# Patient Record
Sex: MALE | Race: WHITE | NOT HISPANIC OR LATINO | Employment: OTHER | URBAN - METROPOLITAN AREA
[De-identification: names, ages, dates, MRNs, and addresses within clinical notes are randomized per-mention and may not be internally consistent; named-entity substitution may affect disease eponyms.]

---

## 2022-01-28 ENCOUNTER — THERAPY VISIT (OUTPATIENT)
Dept: PHYSICAL THERAPY | Facility: CLINIC | Age: 77
End: 2022-01-28
Payer: COMMERCIAL

## 2022-01-28 DIAGNOSIS — M25.562 PAIN IN BOTH KNEES: ICD-10-CM

## 2022-01-28 DIAGNOSIS — M25.561 PAIN IN BOTH KNEES: ICD-10-CM

## 2022-01-28 PROCEDURE — 97110 THERAPEUTIC EXERCISES: CPT | Mod: GP | Performed by: PHYSICAL THERAPIST

## 2022-01-28 PROCEDURE — 97161 PT EVAL LOW COMPLEX 20 MIN: CPT | Mod: GP | Performed by: PHYSICAL THERAPIST

## 2022-01-28 ASSESSMENT — ACTIVITIES OF DAILY LIVING (ADL)
KNEEL ON THE FRONT OF YOUR KNEE: ACTIVITY IS MINIMALLY DIFFICULT
KNEE_ACTIVITY_OF_DAILY_LIVING_SCORE: 88.57
STAND: ACTIVITY IS NOT DIFFICULT
KNEE_ACTIVITY_OF_DAILY_LIVING_SUM: 62
WEAKNESS: I HAVE THE SYMPTOM BUT IT DOES NOT AFFECT MY ACTIVITY
STIFFNESS: I DO NOT HAVE THE SYMPTOM
WALK: ACTIVITY IS MINIMALLY DIFFICULT
RAW_SCORE: 62
PAIN: I HAVE THE SYMPTOM BUT IT DOES NOT AFFECT MY ACTIVITY
RISE FROM A CHAIR: ACTIVITY IS MINIMALLY DIFFICULT
GO DOWN STAIRS: ACTIVITY IS MINIMALLY DIFFICULT
AS_A_RESULT_OF_YOUR_KNEE_INJURY,_HOW_WOULD_YOU_RATE_YOUR_CURRENT_LEVEL_OF_DAILY_ACTIVITY?: NEARLY NORMAL
GIVING WAY, BUCKLING OR SHIFTING OF KNEE: I DO NOT HAVE THE SYMPTOM
SWELLING: I DO NOT HAVE THE SYMPTOM
GO UP STAIRS: ACTIVITY IS SOMEWHAT DIFFICULT
LIMPING: I DO NOT HAVE THE SYMPTOM
HOW_WOULD_YOU_RATE_THE_OVERALL_FUNCTION_OF_YOUR_KNEE_DURING_YOUR_USUAL_DAILY_ACTIVITIES?: NEARLY NORMAL
SQUAT: ACTIVITY IS NOT DIFFICULT
HOW_WOULD_YOU_RATE_THE_CURRENT_FUNCTION_OF_YOUR_KNEE_DURING_YOUR_USUAL_DAILY_ACTIVITIES_ON_A_SCALE_FROM_0_TO_100_WITH_100_BEING_YOUR_LEVEL_OF_KNEE_FUNCTION_PRIOR_TO_YOUR_INJURY_AND_0_BEING_THE_INABILITY_TO_PERFORM_ANY_OF_YOUR_USUAL_DAILY_ACTIVITIES?: 85
SIT WITH YOUR KNEE BENT: ACTIVITY IS NOT DIFFICULT

## 2022-01-28 NOTE — PROGRESS NOTES
[facility]    OUTPATIENT Physical Therapy ORTHOPEDIC EVALUATION  PLAN OF TREATMENT FOR OUTPATIENT REHABILITATION  (COMPLETE FOR INITIAL CLAIMS ONLY)  Patient's Last Name, First Name, M.I.  YOB: 1945  Rozinov,Gennady       Provider s Name:  [facility]   Medical Record No.  [id number]   Start of Care Date:  01/28/22   Onset Date:   01/24/22   Type:     _X__PT   ___OT Medical Diagnosis:    Encounter Diagnosis   Name Primary?     Pain in both knees         Treatment Diagnosis:  B knee pain         Goals:     01/28/22 0500   Body Part   Goals listed below are for B knee pain   Goal #1   Goal #1 stairs   Previous Functional Level No restrictions   Current Functional Level Ascend/descend stairs;one step at a time   Performance Level pain 5/10 with ascent   STG Target Performance Ascend/descend stairs;one step at a time   Performance Level painfree   Rationale to enter/leave the house safely   Due Date 02/25/22   LTG Target Performance Ascend/descend stairs;in a normal reciprocal pattern   Performance Level painfree   Rationale to enter/leave the house safely;for safe community access to buildings   Due Date 03/25/22       Therapy Frequency:  1x week  Predicted Duration of Therapy Intervention:  4 weeks    Angel Casillas, PT                 I CERTIFY THE NEED FOR THESE SERVICES FURNISHED UNDER        THIS PLAN OF TREATMENT AND WHILE UNDER MY CARE .             Physician Signature               Date    X_____________________________________________________                             Certification Date From:  01/28/22   Certification Date To:  04/12/22    Referring Provider:  Vinicius Bauer    Initial Assessment        See Epic Evaluation SOC Date: 01/28/22

## 2022-01-28 NOTE — PROGRESS NOTES
Physical Therapy Initial Evaluation  Subjective:    Therapist Generated HPI Evaluation  Problem details: Pt presents to PT with a chief complaint of recurrent B anterior knee pain with recent worsening in the past year (MD visit 1/24/22). Pain reported to be worse with stair climbing primarily and occasionally with ambulation. Pt diagnosed with patellar tendinitis and referred to PT .         Type of problem:  Bilateral knees.    This is a recurrent condition.  Condition occurred with:  Insidious onset.  Where condition occurred: for unknown reasons.  Patient reports pain:  Anterior.  Pain is described as sharp and is intermittent.  Pain is the same all the time.  Since onset symptoms are gradually improving.  Associated symptoms:  Loss of motion/stiffness. Symptoms are exacerbated by bending/squatting and ascending stairs  and relieved by ice.      Barriers include:  None as reported by patient.    Patient Health History         Pain is reported as 3/10 on pain scale.  General health as reported by patient is fair.  Pertinent medical history includes: high blood pressure and heart problems.   Red flags:  None as reported by patient.  Medical allergies: none.   Surgeries: hernia.    Current medications:  Cardiac medication and high blood pressure medication.    Occupation: retired.                                       Objective:    Gait:  Mild increased pronation bilaterally    Gait Type:  Normal                                                      Hip Evaluation    Hip Strength:      Extension:  Left: 4+/5  Pain:Right: 4+/5    Pain:    Abduction:  Left: 4+/5     Pain:Right: 4+/5    Pain:                           Knee Evaluation:  ROM:    AROM      Extension:  Left: 0    Right:  0  Flexion: Left: 130    Right: 130        Strength:     Extension:  Left: 5/5   Pain:      Right: 5/5    Pain:+  Flexion:  Left: 5/5   Pain:      Right: 5/5   Pain:    Quad Set Left: Good    Pain:   Quad Set Right: Good     Pain:    Special Tests:     Left knee negative for the following special tests:  Meninscal    Right knee negative for the following special tests:  Meniscal  Palpation:      Right knee tenderness present at:  Patellar Tendon      Functional Testing:  : Min anterior knee pain with step ups, inconsistent per patient.                  General     ROS    Assessment/Plan:    Patient is a 76 year old male with both sides knee complaints.    Patient has the following significant findings with corresponding treatment plan.                Diagnosis 1:  B knee pain  Pain -  manual therapy, splint/taping/bracing/orthotics and self management  Decreased joint mobility - manual therapy and therapeutic exercise  Decreased strength - therapeutic exercise and therapeutic activities  Impaired muscle performance - neuro re-education    Therapy Evaluation Codes:     Cumulative Therapy Evaluation is: Low complexity.    Previous and current functional limitations:  (See Goal Flow Sheet for this information)    Short term and Long term goals: (See Goal Flow Sheet for this information)     Communication ability:  Patient appears to be able to clearly communicate and understand verbal and written communication and follow directions correctly.  Treatment Explanation - The following has been discussed with the patient:   RX ordered/plan of care  Anticipated outcomes  Possible risks and side effects  This patient would benefit from PT intervention to resume normal activities.   Rehab potential is good.    Frequency:  1 X week, once daily  Duration:  for 4 weeks  Discharge Plan:  Achieve all LTG.  Independent in home treatment program.  Reach maximal therapeutic benefit.    Please refer to the daily flowsheet for treatment today, total treatment time and time spent performing 1:1 timed codes.

## 2022-02-11 ENCOUNTER — THERAPY VISIT (OUTPATIENT)
Dept: PHYSICAL THERAPY | Facility: CLINIC | Age: 77
End: 2022-02-11
Payer: COMMERCIAL

## 2022-02-11 DIAGNOSIS — M25.561 PAIN IN BOTH KNEES: Primary | ICD-10-CM

## 2022-02-11 DIAGNOSIS — M25.562 PAIN IN BOTH KNEES: Primary | ICD-10-CM

## 2022-02-11 PROCEDURE — 97110 THERAPEUTIC EXERCISES: CPT | Mod: GP | Performed by: PHYSICAL THERAPIST

## 2022-02-11 PROCEDURE — 97112 NEUROMUSCULAR REEDUCATION: CPT | Mod: GP | Performed by: PHYSICAL THERAPIST

## 2022-02-21 ENCOUNTER — TRANSCRIBE ORDERS (OUTPATIENT)
Dept: OTHER | Age: 77
End: 2022-02-21
Payer: COMMERCIAL

## 2022-02-21 DIAGNOSIS — M47.816 SPONDYLOSIS OF LUMBAR REGION WITHOUT MYELOPATHY OR RADICULOPATHY: Primary | ICD-10-CM

## 2022-03-04 ENCOUNTER — THERAPY VISIT (OUTPATIENT)
Dept: PHYSICAL THERAPY | Facility: CLINIC | Age: 77
End: 2022-03-04
Payer: COMMERCIAL

## 2022-03-04 DIAGNOSIS — M47.816 SPONDYLOSIS OF LUMBAR REGION WITHOUT MYELOPATHY OR RADICULOPATHY: ICD-10-CM

## 2022-03-04 PROCEDURE — 97161 PT EVAL LOW COMPLEX 20 MIN: CPT | Mod: GP | Performed by: PHYSICAL THERAPIST

## 2022-03-04 PROCEDURE — 97112 NEUROMUSCULAR REEDUCATION: CPT | Mod: GP | Performed by: PHYSICAL THERAPIST

## 2022-03-04 PROCEDURE — 97110 THERAPEUTIC EXERCISES: CPT | Mod: GP | Performed by: PHYSICAL THERAPIST

## 2022-03-06 NOTE — PROGRESS NOTES
[facility]    OUTPATIENT Physical Therapy ORTHOPEDIC EVALUATION  PLAN OF TREATMENT FOR OUTPATIENT REHABILITATION  (COMPLETE FOR INITIAL CLAIMS ONLY)  Patient's Last Name, First Name, M.I.  YOB: 1945  Rozinov,Gennady       Provider s Name:  [facility]   Medical Record No.  [id number]   Start of Care Date:  03/04/22   Onset Date:   02/20/22   Type:     _X__PT   ___OT Medical Diagnosis:    Encounter Diagnosis   Name Primary?     Spondylosis of lumbar region without myelopathy or radiculopathy         Treatment Diagnosis:           Goals:     03/04/22 0500   Body Part   Goals listed below are for LBP   Goal #2   Goal #2 sitting   Previous Functional Level No restrictions   Current Functional Level Minutes patient can sit   Performance level 30' pain 5/10 w/ transition   STG Target Performance Minutes patient will be able to sit   Performance level 30' painfree   Rationale for personal hygiene;for community transportation   Due date 04/01/22   LTG Target Performance Minutes patient will be able to sit   Performance Level >60' painfree   Rationale for personal hygiene;to allow rest from standing;for community transportation   Due date 04/29/22       Therapy Frequency:  2x month  Predicted Duration of Therapy Intervention:  2 months     Angel Casillas, PT                 I CERTIFY THE NEED FOR THESE SERVICES FURNISHED UNDER        THIS PLAN OF TREATMENT AND WHILE UNDER MY CARE     (Physician attestation of this document indicates review and certification of the therapy plan).                       Certification Date From:  03/04/22   Certification Date To:  05/18/22    Referring Provider:  Laurie Yoon    Initial Assessment        See Epic Evaluation SOC Date: 03/04/22

## 2022-03-06 NOTE — PROGRESS NOTES
Physical Therapy Initial Evaluation  Subjective:    Therapist Generated HPI Evaluation  Problem details: Pt presents to PT with a chief complaint of chronic LBP with recent exacerbation over the past month 02/2022. Primary pain location identified at L lower lumbar spine and patient denies any distal radiating symptoms. Pain worse with lifting, prolonged sitting, and transitional movements. Pt completes a low back stretching program daily and wondering if there are any changes that can be made. .         Type of problem:  Lumbar.    This is a chronic condition.  Condition occurred with:  Degenerative joint disease.  Where condition occurred: for unknown reasons.  Patient reports pain:  Lower lumbar spine and lumbar spine left.  Pain is described as aching and is intermittent.  Pain radiates to:  No radiation. Pain is worse in the P.M..  Since onset symptoms are gradually worsening.  Associated symptoms:  Loss of motion/stiffness. Symptoms are exacerbated by bending, twisting, lifting and sitting  and relieved by rest and heat.  Special tests included:  MRI.    Barriers include:  None as reported by patient.    Patient Health History         Pain is reported as 2/10 on pain scale.  General health as reported by patient is fair.  Pertinent medical history includes: heart problems and high blood pressure.   Red flags:  None as reported by patient.  Medical allergies: none.   Surgeries: hernia.    Current medications:  Cardiac medication and high blood pressure medication.    Occupation: retired.                     Oswestry Score: 22.22 %                 Objective:  System         Lumbar/SI Evaluation  ROM:    AROM Lumbar:   Flexion:          Mod loss, tight HS bilaterally  Ext:                    Mod loss, pain ++   Side Bend:        Left:     Right:   Rotation:           Left:  Mod loss    Right:  Min loss  Side Glide:        Left:     Right:           Lumbar Myotomes:  normal                Lumbar Dermtomes:   normal                Neural Tension/Mobility:  Lumbar:  Normal        Lumbar Palpation:    Tenderness present at Left:    Erector Spinae  Tenderness present at Right: Erector Spinae      Spinal Segmental Conclusions:     Level: Hypo noted at L5 and L4                                                       General Evaluation:          Lower Extremity Flexibility:                Hamstrings:  Decreased flexibility                                                                  ROS    Assessment/Plan:    Patient is a 76 year old male with lumbar complaints.    Patient has the following significant findings with corresponding treatment plan.                Diagnosis 1:  LBP  Pain -  manual therapy, self management, education and directional preference exercise  Decreased ROM/flexibility - manual therapy and therapeutic exercise  Decreased joint mobility - manual therapy and therapeutic exercise  Decreased strength - therapeutic exercise and therapeutic activities  Impaired muscle performance - neuro re-education    Therapy Evaluation Codes:     Cumulative Therapy Evaluation is: Low complexity.    Previous and current functional limitations:  (See Goal Flow Sheet for this information)    Short term and Long term goals: (See Goal Flow Sheet for this information)     Communication ability:  Patient appears to be able to clearly communicate and understand verbal and written communication and follow directions correctly.  Treatment Explanation - The following has been discussed with the patient:   RX ordered/plan of care  Anticipated outcomes  Possible risks and side effects  This patient would benefit from PT intervention to resume normal activities.   Rehab potential is good.    Frequency:  1 X week, once daily  Duration:  for 4 weeks tapering to 2 X a month over 4 weeks  Discharge Plan:  Achieve all LTG.  Independent in home treatment program.  Reach maximal therapeutic benefit.    Please refer to the daily flowsheet for  treatment today, total treatment time and time spent performing 1:1 timed codes.

## 2022-03-24 ENCOUNTER — THERAPY VISIT (OUTPATIENT)
Dept: PHYSICAL THERAPY | Facility: CLINIC | Age: 77
End: 2022-03-24
Payer: COMMERCIAL

## 2022-03-24 DIAGNOSIS — M47.816 SPONDYLOSIS OF LUMBAR REGION WITHOUT MYELOPATHY OR RADICULOPATHY: ICD-10-CM

## 2022-03-24 PROCEDURE — 97110 THERAPEUTIC EXERCISES: CPT | Mod: GP | Performed by: PHYSICAL THERAPIST

## 2022-03-24 PROCEDURE — 97112 NEUROMUSCULAR REEDUCATION: CPT | Mod: GP | Performed by: PHYSICAL THERAPIST

## 2022-04-14 ENCOUNTER — THERAPY VISIT (OUTPATIENT)
Dept: PHYSICAL THERAPY | Facility: CLINIC | Age: 77
End: 2022-04-14
Payer: COMMERCIAL

## 2022-04-14 DIAGNOSIS — M47.816 SPONDYLOSIS OF LUMBAR REGION WITHOUT MYELOPATHY OR RADICULOPATHY: Primary | ICD-10-CM

## 2022-04-14 PROCEDURE — 97112 NEUROMUSCULAR REEDUCATION: CPT | Mod: GP | Performed by: PHYSICAL THERAPIST

## 2022-04-14 PROCEDURE — 97110 THERAPEUTIC EXERCISES: CPT | Mod: GP | Performed by: PHYSICAL THERAPIST

## 2022-09-07 NOTE — TELEPHONE ENCOUNTER
REFERRAL INFORMATION:    Referring Provider:  Dr. Alfredo Saleem     Referring Clinic:  [facility]     Reason for Visit/Diagnosis: Inguinal hernia- second opinion        FUTURE VISIT INFORMATION:    Appointment Date: 9/12/2022    Appointment Time: 1 PM      NOTES RECORD STATUS  DETAILS   OFFICE NOTE from Referring Provider Care Everywhere 8/31/2022, 8/31/2020 Office visit with Dr. Herrera Shaw      OFFICE NOTE from Other Specialists N/A    HOSPITAL DISCHARGE SUMMARY/ ED VISITS  N/A    OPERATIVE REPORT N/A    ENDOSCOPY (EGD)  N/A    PERTINENT LABS Care Everywhere 7/6/17 ([facility])   2/13/13 ([facility])- scanned in     PATHOLOGY REPORTS (RELATED) Care Everywhere    IMAGING (CT, MRI, US, XR)  N/A

## 2022-09-09 ENCOUNTER — PRE VISIT (OUTPATIENT)
Dept: SURGERY | Facility: CLINIC | Age: 77
End: 2022-09-09

## 2022-09-09 NOTE — TELEPHONE ENCOUNTER
Patient Telephone Reminder Call    Date of call:  09/09/22  Phone numbers:  Home number on file [phone] (home)    Reached patient/confirmed appointment:  No - left message:   on voicemail  Appointment with:   Dr. Rishi London  Reason for visit:  Inguinal hernia, second opinion

## 2022-09-12 ENCOUNTER — PRE VISIT (OUTPATIENT)
Dept: SURGERY | Facility: CLINIC | Age: 77
End: 2022-09-12

## 2022-09-12 ENCOUNTER — OFFICE VISIT (OUTPATIENT)
Dept: SURGERY | Facility: CLINIC | Age: 77
End: 2022-09-12
Payer: COMMERCIAL

## 2022-09-12 VITALS
HEIGHT: 72 IN | DIASTOLIC BLOOD PRESSURE: 80 MMHG | BODY MASS INDEX: 22.73 KG/M2 | HEART RATE: 59 BPM | WEIGHT: 167.8 LBS | SYSTOLIC BLOOD PRESSURE: 156 MMHG | OXYGEN SATURATION: 100 %

## 2022-09-12 DIAGNOSIS — K40.90 LEFT INGUINAL HERNIA: Primary | ICD-10-CM

## 2022-09-12 PROCEDURE — 99203 OFFICE O/P NEW LOW 30 MIN: CPT | Performed by: SURGERY

## 2022-09-12 RX ORDER — FLUCONAZOLE 10 MG/ML
POWDER, FOR SUSPENSION ORAL
COMMUNITY

## 2022-09-12 RX ORDER — ATORVASTATIN CALCIUM 10 MG/1
10 TABLET, FILM COATED ORAL DAILY
COMMUNITY

## 2022-09-12 RX ORDER — SOTALOL HYDROCHLORIDE 120 MG/1
120 TABLET ORAL DAILY
COMMUNITY

## 2022-09-12 ASSESSMENT — PAIN SCALES - GENERAL: PAINLEVEL: NO PAIN (0)

## 2022-09-12 NOTE — NURSING NOTE
Chief Complaint   Patient presents with     New Patient     Second opinion on inguinal hernia       Vitals:    09/12/22 1246   BP: (!) 156/80   BP Location: Left arm   Patient Position: Sitting   Cuff Size: Adult Regular   Pulse: 59   SpO2: 100%   Weight: 76.1 kg (167 lb 12.8 oz)   Height: 1.829 m (6')       Body mass index is 22.76 kg/m .                          Ernst Estes, EMT

## 2022-09-12 NOTE — LETTER
"9/12/2022       RE: Gennady Rozinov  [address]  [place] [address]     Dear Colleague,    Thank you for referring your patient, Gennady Rozinov, to the [facility] [place] at [facility]. Please see a copy of my visit note below.    Gennady Rozinov is a 77 year old male with a 5 month history of a left inguinal mass with the following symptoms of lump.    Onset did not occur with lifting.  Obstructive symptoms:  no  Urinary difficulties:  no  Chronic cough: no  Constipation:  no  Current level of activity:  Low, does some walking.    Past medical and surgical history, medications, allergies, family history, and social history were reviewed with the patient.  Had lap BIH robot last year at OSH, where he is scheduled for elective repair of his recurrent LIH.    Today here for second opinion as his wife thinks he should wait. Tentatively schedule for open mesh repair LIH.    Op report from 4/9/21 reviewed.    ROS: 10 point review of systems negative except noted in HPI  PHYSICAL EXAM  General appearance- healthy, alert, and in no distress.  Skin- Skin color and turgor normal.  No obvious rashes.  Neck- Neck is supple without obvious adenopathy.  Lungs- Respiratory effort unlabored.  Gait- Normal.  Abdomen - soft non distended, non tender without obvious masses.  Left inguinal canal with bulge consistent with recurrent LIH.    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  I agree with the proposed plan as this hernia is most likely to get bigger over time. Would be best to address before advanced age.    \"Total time = 30 minutes, spent on the date of encounter doing chart review, history and physical, documentation, patient education, and any further activity as noted above.       Sincerely,    Rishi London MD  "

## 2022-09-12 NOTE — PATIENT INSTRUCTIONS
You met with Dr. Rishi London.      Today's visit instructions:    Dr. London recommends that you proceed with surgery as scheduled with your General Surgeon at [facility]. Return to the Surgery Clinic on an as needed basis.        If you have questions please contact Jena RN or Sonia RN during regular clinic hours, Monday through Friday 7:30 AM - 4:00 PM, or you can contact us via DearLocal at anytime.       If you have urgent needs after-hours, weekends, or holidays please call the hospital at [phone] and ask to speak with our on-call General Surgery Team.    Appointment [phone]  Nurse Advice (Jena or Sonia): [phone]   Surgery Scheduler (Fabio): [phone]  Fax: [phone]

## 2022-09-12 NOTE — PROGRESS NOTES
"Gennady Rozinov is a 77 year old male with a 5 month history of a left inguinal mass with the following symptoms of lump.    Onset did not occur with lifting.  Obstructive symptoms:  no  Urinary difficulties:  no  Chronic cough: no  Constipation:  no  Current level of activity:  Low, does some walking.    Past medical and surgical history, medications, allergies, family history, and social history were reviewed with the patient.  Had lap BIH robot last year at OSH, where he is scheduled for elective repair of his recurrent LIH.    Today here for second opinion as his wife thinks he should wait. Tentatively schedule for open mesh repair LIH.    Op report from 4/9/21 reviewed.    ROS: 10 point review of systems negative except noted in HPI  PHYSICAL EXAM  General appearance- healthy, alert, and in no distress.  Skin- Skin color and turgor normal.  No obvious rashes.  Neck- Neck is supple without obvious adenopathy.  Lungs- Respiratory effort unlabored.  Gait- Normal.  Abdomen - soft non distended, non tender without obvious masses.  Left inguinal canal with bulge consistent with recurrent LIH.    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  I agree with the proposed plan as this hernia is most likely to get bigger over time. Would be best to address before advanced age.    \"Total time = 30 minutes, spent on the date of encounter doing chart review, history and physical, documentation, patient education, and any further activity as noted above.               "

## 2023-05-08 PROBLEM — M25.561 PAIN IN BOTH KNEES: Status: RESOLVED | Noted: 2022-01-28 | Resolved: 2023-05-08

## 2023-05-08 PROBLEM — M25.562 PAIN IN BOTH KNEES: Status: RESOLVED | Noted: 2022-01-28 | Resolved: 2023-05-08

## 2023-05-08 PROBLEM — M47.816 SPONDYLOSIS OF LUMBAR REGION WITHOUT MYELOPATHY OR RADICULOPATHY: Status: RESOLVED | Noted: 2022-03-04 | Resolved: 2023-05-08

## 2023-05-08 NOTE — PROGRESS NOTES
Pt has not returned to PT and will be discharged. Please refer to last SOAP note for current information